# Patient Record
Sex: FEMALE | Race: WHITE | URBAN - METROPOLITAN AREA
[De-identification: names, ages, dates, MRNs, and addresses within clinical notes are randomized per-mention and may not be internally consistent; named-entity substitution may affect disease eponyms.]

---

## 2017-01-24 PROBLEM — 55570000 ASTHMA WITHOUT STATUS ASTHMATICUS: Status: ACTIVE | Noted: 2017-01-24

## 2017-01-24 PROBLEM — 35489007 DEPRESSION: Status: ACTIVE | Noted: 2017-01-24

## 2017-01-24 PROBLEM — 38341003 HYPERTENSION: Status: ACTIVE | Noted: 2017-01-24

## 2017-01-24 PROBLEM — 235595009 GASTROESOPHAGEAL REFLUX DISEASE: Status: ACTIVE | Noted: 2017-01-24

## 2017-01-24 PROBLEM — 397825006 GASTRIC ULCER: Status: ACTIVE | Noted: 2017-01-24

## 2017-01-25 PROBLEM — 238136002 MORBID OBESITY: Status: ACTIVE | Noted: 2017-01-25

## 2017-01-25 PROBLEM — 197480006 ANXIETY DISORDER: Status: ACTIVE | Noted: 2017-01-25

## 2021-08-28 ENCOUNTER — TELEPHONE ENCOUNTER (OUTPATIENT)
Dept: URBAN - METROPOLITAN AREA CLINIC 13 | Facility: CLINIC | Age: 56
End: 2021-08-28

## 2021-08-28 RX ORDER — TERBINAFINE HYDROCHLORIDE 250 MG/1
TABLET ORAL
OUTPATIENT
End: 2019-05-20

## 2021-08-29 ENCOUNTER — TELEPHONE ENCOUNTER (OUTPATIENT)
Dept: URBAN - METROPOLITAN AREA CLINIC 13 | Facility: CLINIC | Age: 56
End: 2021-08-29

## 2021-08-29 RX ORDER — BIOTIN 10 MG
TABLET ORAL
Status: ACTIVE | COMMUNITY

## 2021-08-29 RX ORDER — CHLORTHALIDONE 25 MG/1
TABLET ORAL
Status: ACTIVE | COMMUNITY

## 2021-08-29 RX ORDER — AMLODIPINE BESYLATE 2.5 MG/1
TABLET ORAL
Status: ACTIVE | COMMUNITY

## 2021-08-29 RX ORDER — MELOXICAM 15 MG/1
TABLET ORAL
Status: ACTIVE | COMMUNITY

## 2021-08-29 RX ORDER — OMEPRAZOLE 10 MG/1
CAPSULE, DELAYED RELEASE ORAL
Status: ACTIVE | COMMUNITY

## 2021-08-29 RX ORDER — SUCRALFATE 1 G/1
TABLET ORAL
Status: ACTIVE | COMMUNITY
Start: 2019-05-20

## 2021-08-29 RX ORDER — LOSARTAN POTASSIUM 50 MG/1
TABLET, FILM COATED ORAL
Status: ACTIVE | COMMUNITY

## 2021-08-29 RX ORDER — DICLOFENAC SODIUM 75 MG/1
TABLET, DELAYED RELEASE ORAL
Status: ACTIVE | COMMUNITY

## 2022-07-07 ENCOUNTER — OFFICE VISIT (OUTPATIENT)
Dept: URBAN - METROPOLITAN AREA CLINIC 46 | Facility: CLINIC | Age: 57
End: 2022-07-07
Payer: COMMERCIAL

## 2022-07-07 VITALS
HEART RATE: 68 BPM | WEIGHT: 245.2 LBS | SYSTOLIC BLOOD PRESSURE: 137 MMHG | TEMPERATURE: 98.1 F | DIASTOLIC BLOOD PRESSURE: 81 MMHG | BODY MASS INDEX: 39.41 KG/M2 | HEIGHT: 66 IN

## 2022-07-07 DIAGNOSIS — R10.13 EPIGASTRIC PAIN: ICD-10-CM

## 2022-07-07 DIAGNOSIS — Z90.3 H/O GASTRIC SLEEVE: ICD-10-CM

## 2022-07-07 DIAGNOSIS — Z87.11 HISTORY OF PEPTIC ULCER DISEASE: ICD-10-CM

## 2022-07-07 PROCEDURE — 99214 OFFICE O/P EST MOD 30 MIN: CPT | Performed by: INTERNAL MEDICINE

## 2022-07-07 RX ORDER — OMEPRAZOLE 40 MG/1
1 CAPSULE 30 MINUTES BEFORE MORNING MEAL CAPSULE, DELAYED RELEASE ORAL ONCE A DAY
Qty: 30 | Refills: 2 | OUTPATIENT
Start: 2022-07-07

## 2022-07-07 RX ORDER — DULOXETINE 60 MG/1
1 CAPSULE CAPSULE, DELAYED RELEASE PELLETS ORAL ONCE A DAY
Status: ACTIVE | COMMUNITY

## 2022-07-07 RX ORDER — SUCRALFATE 1 G/1
1 TABLET ON AN EMPTY STOMACH TABLET ORAL
Qty: 42 | Refills: 1 | OUTPATIENT
Start: 2022-07-07 | End: 2022-08-04

## 2022-07-07 RX ORDER — DICLOFENAC SODIUM 75 MG/1
TABLET, DELAYED RELEASE ORAL
Status: ACTIVE | COMMUNITY

## 2022-07-07 RX ORDER — DULOXETINE 30 MG/1
1 CAPSULE CAPSULE, DELAYED RELEASE PELLETS ORAL ONCE A DAY
Status: ACTIVE | COMMUNITY

## 2022-07-07 RX ORDER — AMLODIPINE BESYLATE 2.5 MG/1
TABLET ORAL
COMMUNITY

## 2022-07-07 RX ORDER — LOSARTAN POTASSIUM 50 MG/1
TABLET, FILM COATED ORAL
COMMUNITY

## 2022-07-07 NOTE — PHYSICAL EXAM NEUROLOGIC:
oriented to person, place and time , normal sensation , short and long term memory intact
Statement Selected

## 2022-07-07 NOTE — HPI-TODAY'S VISIT:
56 y/o female presents for evaluation of abdominal pain. She was last seen in 5/2019 for evaluation of dysphagia and average risk colon cancer screening. She is status post Nissen fundoplication in 2015. EGD was grossly normal with empiric dilation with 45 FR bougie, and colonoscopy showed diverticulosis and internal hemorrhoids. She reports remote h/o bleeding PUD. She underwent gastric sleeve surgery in June 2020 and had to have Lap Nissen taken down at that time in order to have sleeve done. She also has h/o OA, and for three months was taking Celebrex daily as well as an OTC Prilosec. She stopped about a month ago. For the last several weeks, she has been having daily epigastric pain with radiation to the back, worsened by meals. She has had some nausea, but no vomiting. No melena or hematochezia. She does also still have her gallbladder. No fevers. She currently does not have a PCP and states she has not had any recent lab work done.

## 2022-07-25 ENCOUNTER — CLAIMS CREATED FROM THE CLAIM WINDOW (OUTPATIENT)
Dept: URBAN - METROPOLITAN AREA CLINIC 4 | Facility: CLINIC | Age: 57
End: 2022-07-25
Payer: COMMERCIAL

## 2022-07-25 ENCOUNTER — OFFICE VISIT (OUTPATIENT)
Dept: URBAN - METROPOLITAN AREA SURGERY CENTER 28 | Facility: SURGERY CENTER | Age: 57
End: 2022-07-25
Payer: COMMERCIAL

## 2022-07-25 ENCOUNTER — TELEPHONE ENCOUNTER (OUTPATIENT)
Dept: URBAN - METROPOLITAN AREA CLINIC 44 | Facility: CLINIC | Age: 57
End: 2022-07-25

## 2022-07-25 ENCOUNTER — LAB OUTSIDE AN ENCOUNTER (OUTPATIENT)
Dept: URBAN - METROPOLITAN AREA CLINIC 44 | Facility: CLINIC | Age: 57
End: 2022-07-25

## 2022-07-25 DIAGNOSIS — K31.89 ACQUIRED DEFORMITY OF DUODENUM: ICD-10-CM

## 2022-07-25 DIAGNOSIS — Z98.84 BARIATRIC SURG STAT-UNSP: ICD-10-CM

## 2022-07-25 DIAGNOSIS — R10.13 ABDOMINAL DISCOMFORT, EPIGASTRIC: ICD-10-CM

## 2022-07-25 DIAGNOSIS — K31.89 OTHER DISEASES OF STOMACH AND DUODENUM: ICD-10-CM

## 2022-07-25 PROCEDURE — 88312 SPECIAL STAINS GROUP 1: CPT | Performed by: PATHOLOGY

## 2022-07-25 PROCEDURE — 88305 TISSUE EXAM BY PATHOLOGIST: CPT | Performed by: PATHOLOGY

## 2022-07-25 PROCEDURE — 43239 EGD BIOPSY SINGLE/MULTIPLE: CPT | Performed by: INTERNAL MEDICINE

## 2022-07-25 PROCEDURE — G8907 PT DOC NO EVENTS ON DISCHARG: HCPCS | Performed by: INTERNAL MEDICINE

## 2022-07-25 RX ORDER — OMEPRAZOLE 40 MG/1
1 CAPSULE 30 MINUTES BEFORE MORNING MEAL CAPSULE, DELAYED RELEASE ORAL ONCE A DAY
Qty: 30 | Refills: 2 | Status: ACTIVE | COMMUNITY
Start: 2022-07-07

## 2022-07-25 RX ORDER — DULOXETINE 30 MG/1
1 CAPSULE CAPSULE, DELAYED RELEASE PELLETS ORAL ONCE A DAY
Status: ACTIVE | COMMUNITY

## 2022-07-25 RX ORDER — DICLOFENAC SODIUM 75 MG/1
TABLET, DELAYED RELEASE ORAL
Status: ACTIVE | COMMUNITY

## 2022-07-25 RX ORDER — DULOXETINE 60 MG/1
1 CAPSULE CAPSULE, DELAYED RELEASE PELLETS ORAL ONCE A DAY
Status: ACTIVE | COMMUNITY

## 2022-07-25 RX ORDER — SUCRALFATE 1 G/1
1 TABLET ON AN EMPTY STOMACH TABLET ORAL
Qty: 42 | Refills: 1 | Status: ACTIVE | COMMUNITY
Start: 2022-07-07 | End: 2022-08-04

## 2022-07-25 RX ORDER — LOSARTAN POTASSIUM 50 MG/1
TABLET, FILM COATED ORAL
COMMUNITY

## 2022-07-25 RX ORDER — AMLODIPINE BESYLATE 2.5 MG/1
TABLET ORAL
COMMUNITY

## 2022-07-25 RX ORDER — DICYCLOMINE HYDROCHLORIDE 20 MG/1
1 TABLET TABLET ORAL
Qty: 60 | Refills: 3 | OUTPATIENT

## 2022-08-09 ENCOUNTER — TELEPHONE ENCOUNTER (OUTPATIENT)
Dept: URBAN - METROPOLITAN AREA CLINIC 46 | Facility: CLINIC | Age: 57
End: 2022-08-09

## 2022-08-18 ENCOUNTER — TELEPHONE ENCOUNTER (OUTPATIENT)
Dept: URBAN - METROPOLITAN AREA CLINIC 46 | Facility: CLINIC | Age: 57
End: 2022-08-18

## 2022-08-25 ENCOUNTER — TELEPHONE ENCOUNTER (OUTPATIENT)
Dept: URBAN - METROPOLITAN AREA CLINIC 46 | Facility: CLINIC | Age: 57
End: 2022-08-25

## 2022-08-25 RX ORDER — DICYCLOMINE HYDROCHLORIDE 20 MG/1
1 TABLET TABLET ORAL
Qty: 90 | Refills: 3

## 2022-08-25 RX ORDER — OMEPRAZOLE 40 MG/1
1 CAPSULE 30 MINUTES BEFORE MORNING MEAL CAPSULE, DELAYED RELEASE ORAL ONCE A DAY
Qty: 90 | Refills: 3

## 2023-04-07 ENCOUNTER — TELEPHONE ENCOUNTER (OUTPATIENT)
Dept: URBAN - METROPOLITAN AREA CLINIC 44 | Facility: CLINIC | Age: 58
End: 2023-04-07

## 2023-04-07 RX ORDER — OMEPRAZOLE 40 MG/1
1 CAPSULE 30 MINUTES BEFORE MORNING MEAL CAPSULE, DELAYED RELEASE ORAL ONCE A DAY
Qty: 90 | Refills: 3 | Status: ACTIVE | COMMUNITY

## 2023-04-07 RX ORDER — DULOXETINE 30 MG/1
1 CAPSULE CAPSULE, DELAYED RELEASE PELLETS ORAL ONCE A DAY
Status: ACTIVE | COMMUNITY

## 2023-04-07 RX ORDER — AMLODIPINE BESYLATE 2.5 MG/1
TABLET ORAL
COMMUNITY

## 2023-04-07 RX ORDER — LOSARTAN POTASSIUM 50 MG/1
TABLET, FILM COATED ORAL
COMMUNITY

## 2023-04-07 RX ORDER — DICYCLOMINE HYDROCHLORIDE 20 MG/1
1 TABLET TABLET ORAL
Qty: 90 | Refills: 3 | Status: ACTIVE | COMMUNITY

## 2023-04-07 RX ORDER — DICLOFENAC SODIUM 75 MG/1
TABLET, DELAYED RELEASE ORAL
Status: ACTIVE | COMMUNITY

## 2023-04-07 RX ORDER — DULOXETINE 60 MG/1
1 CAPSULE CAPSULE, DELAYED RELEASE PELLETS ORAL ONCE A DAY
Status: ACTIVE | COMMUNITY

## 2023-04-07 RX ORDER — CIPROFLOXACIN HYDROCHLORIDE 500 MG/1
1 TABLET TABLET, FILM COATED ORAL
Qty: 20 TABLET | Refills: 0 | OUTPATIENT
Start: 2023-04-10 | End: 2023-04-13

## 2023-04-10 PROBLEM — 88111009: Status: ACTIVE | Noted: 2023-04-10

## 2023-05-12 ENCOUNTER — OFFICE VISIT (OUTPATIENT)
Dept: URBAN - METROPOLITAN AREA TELEHEALTH 2 | Facility: TELEHEALTH | Age: 58
End: 2023-05-12

## 2023-06-15 ENCOUNTER — OFFICE VISIT (OUTPATIENT)
Dept: URBAN - METROPOLITAN AREA CLINIC 19 | Facility: CLINIC | Age: 58
End: 2023-06-15
Payer: COMMERCIAL

## 2023-06-15 ENCOUNTER — LAB OUTSIDE AN ENCOUNTER (OUTPATIENT)
Dept: URBAN - METROPOLITAN AREA CLINIC 19 | Facility: CLINIC | Age: 58
End: 2023-06-15

## 2023-06-15 ENCOUNTER — WEB ENCOUNTER (OUTPATIENT)
Dept: URBAN - METROPOLITAN AREA CLINIC 19 | Facility: CLINIC | Age: 58
End: 2023-06-15

## 2023-06-15 VITALS
TEMPERATURE: 97.4 F | HEART RATE: 72 BPM | BODY MASS INDEX: 37.35 KG/M2 | HEIGHT: 66 IN | SYSTOLIC BLOOD PRESSURE: 124 MMHG | DIASTOLIC BLOOD PRESSURE: 76 MMHG | WEIGHT: 232.4 LBS

## 2023-06-15 DIAGNOSIS — R19.7 ACUTE DIARRHEA: ICD-10-CM

## 2023-06-15 DIAGNOSIS — R10.31 RIGHT LOWER QUADRANT ABDOMINAL PAIN: ICD-10-CM

## 2023-06-15 PROCEDURE — 99214 OFFICE O/P EST MOD 30 MIN: CPT | Performed by: INTERNAL MEDICINE

## 2023-06-15 RX ORDER — DULOXETINE HYDROCHLORIDE 30 MG/1
1 CAPSULE CAPSULE, DELAYED RELEASE ORAL ONCE A DAY
Status: ACTIVE | COMMUNITY

## 2023-06-15 RX ORDER — DULOXETINE HYDROCHLORIDE 60 MG/1
1 CAPSULE CAPSULE, DELAYED RELEASE ORAL ONCE A DAY
Status: ACTIVE | COMMUNITY

## 2023-06-15 RX ORDER — DICYCLOMINE HYDROCHLORIDE 20 MG/1
1 TABLET TABLET ORAL
Qty: 90 | Refills: 1 | OUTPATIENT
Start: 2023-06-15 | End: 2023-08-14

## 2023-06-15 NOTE — HPI-TODAY'S VISIT:
Previous procedures EGD 2019 normal Colonoscopy 2019 diverticulosis, internal hemorrhoids.  7/7/2022 seen by Janeth MOSHER  at [facility] gastroenterology    58 y/o female presents for evaluation of abdominal pain. She was last seen in 5/2019 for evaluation of dysphagia and average risk colon cancer screening. She is status post Nissen fundoplication in 2015. EGD was grossly normal with empiric dilation with 45 FR bougie, and colonoscopy showed diverticulosis and internal hemorrhoids. She reports remote h/o bleeding PUD. She underwent gastric sleeve surgery in June 2020 and had to have Lap Nissen taken down at that time in order to have sleeve done. She also has h/o OA, and for three months was taking Celebrex daily as well as an OTC Prilosec. She stopped about a month ago. For the last several weeks, she has been having daily epigastric pain with radiation to the back, worsened by meals. She has had some nausea, but no vomiting. No melena or hematochezia. She does also still have her gallbladder. No fevers. She currently does not have a PCP and states she has not had any recent lab work done. PLAN placed on omeprazole, was to get an EGD, and labs.  EGD 2022 evidence of sleeve gastrectomy found, healthy appearing, BX stomach negative needs HIDA, Bentyl before meals.  She called her GI office in April 2023 for severe diarrhea and stool studies were ordered, sent in Cipro, align, Imodium. No stool reports  TODAY 6/15/23 Elle BERGER NP She esteban stool studies that were performed with her bariatric surgeon- C/S, C Diff, giardia were negative. She did not take the Cipro that was sent in.   She has diarrhea since march, having 2-7 watery/mushy loose stools a day, with nocturnal symptoms. Denies blood in stool. She has taken Imodium with some improvement.  Has not been exposed to anyone with infectious diarrhea, no recent abx use.  She has had RLQ abdominal pain since March, intermittent, at random. Denies fever/chills, wt loss. She does drink wine nightly, about a bottle.  PLAN chronic stool studies, Ct scan, Colonoscopy

## 2023-06-15 NOTE — PHYSICAL EXAM GASTROINTESTINAL
Abdomen, soft, mild tendernes rlq/lower abd , nondistended, normal bowel sounds, Liver and Spleen, no hepatomegaly present

## 2023-06-27 ENCOUNTER — TELEPHONE ENCOUNTER (OUTPATIENT)
Dept: URBAN - METROPOLITAN AREA CLINIC 19 | Facility: CLINIC | Age: 58
End: 2023-06-27

## 2023-06-28 ENCOUNTER — TELEPHONE ENCOUNTER (OUTPATIENT)
Dept: URBAN - METROPOLITAN AREA CLINIC 19 | Facility: CLINIC | Age: 58
End: 2023-06-28

## 2023-06-28 ENCOUNTER — CLAIMS CREATED FROM THE CLAIM WINDOW (OUTPATIENT)
Dept: URBAN - METROPOLITAN AREA CLINIC 4 | Facility: CLINIC | Age: 58
End: 2023-06-28
Payer: COMMERCIAL

## 2023-06-28 ENCOUNTER — OFFICE VISIT (OUTPATIENT)
Dept: URBAN - METROPOLITAN AREA SURGERY CENTER 31 | Facility: SURGERY CENTER | Age: 58
End: 2023-06-28
Payer: COMMERCIAL

## 2023-06-28 DIAGNOSIS — K52.832 LYMPHOCYTIC  COLITIS: ICD-10-CM

## 2023-06-28 DIAGNOSIS — R19.7 CHRONIC DIARRHEA: ICD-10-CM

## 2023-06-28 DIAGNOSIS — R10.31 RLQ ABDOMINAL PAIN: ICD-10-CM

## 2023-06-28 DIAGNOSIS — K52.832 LYMPHOCYTIC COLITIS: ICD-10-CM

## 2023-06-28 PROCEDURE — 88313 SPECIAL STAINS GROUP 2: CPT | Performed by: PATHOLOGY

## 2023-06-28 PROCEDURE — 45380 COLONOSCOPY AND BIOPSY: CPT | Performed by: INTERNAL MEDICINE

## 2023-06-28 PROCEDURE — 88305 TISSUE EXAM BY PATHOLOGIST: CPT | Performed by: PATHOLOGY

## 2023-06-28 PROCEDURE — 88342 IMHCHEM/IMCYTCHM 1ST ANTB: CPT | Performed by: PATHOLOGY

## 2023-06-28 PROCEDURE — G8907 PT DOC NO EVENTS ON DISCHARG: HCPCS | Performed by: INTERNAL MEDICINE

## 2023-06-28 RX ORDER — DULOXETINE HYDROCHLORIDE 30 MG/1
1 CAPSULE CAPSULE, DELAYED RELEASE ORAL ONCE A DAY
Status: ACTIVE | COMMUNITY

## 2023-06-28 RX ORDER — DULOXETINE HYDROCHLORIDE 60 MG/1
1 CAPSULE CAPSULE, DELAYED RELEASE ORAL ONCE A DAY
Status: ACTIVE | COMMUNITY

## 2023-06-28 RX ORDER — DICYCLOMINE HYDROCHLORIDE 20 MG/1
1 TABLET TABLET ORAL
Qty: 90 | Refills: 1 | Status: ACTIVE | COMMUNITY
Start: 2023-06-15 | End: 2023-08-14

## 2023-07-07 ENCOUNTER — TELEPHONE ENCOUNTER (OUTPATIENT)
Dept: URBAN - METROPOLITAN AREA CLINIC 19 | Facility: CLINIC | Age: 58
End: 2023-07-07

## 2023-08-01 ENCOUNTER — DASHBOARD ENCOUNTERS (OUTPATIENT)
Age: 58
End: 2023-08-01

## 2023-08-01 ENCOUNTER — OFFICE VISIT (OUTPATIENT)
Dept: URBAN - METROPOLITAN AREA CLINIC 19 | Facility: CLINIC | Age: 58
End: 2023-08-01
Payer: COMMERCIAL

## 2023-08-01 ENCOUNTER — TELEPHONE ENCOUNTER (OUTPATIENT)
Dept: URBAN - METROPOLITAN AREA CLINIC 19 | Facility: CLINIC | Age: 58
End: 2023-08-01

## 2023-08-01 VITALS
HEIGHT: 66 IN | WEIGHT: 230.8 LBS | TEMPERATURE: 97.2 F | BODY MASS INDEX: 37.09 KG/M2 | DIASTOLIC BLOOD PRESSURE: 80 MMHG | SYSTOLIC BLOOD PRESSURE: 142 MMHG | OXYGEN SATURATION: 96 % | HEART RATE: 65 BPM

## 2023-08-01 DIAGNOSIS — K52.832 LYMPHOCYTIC  COLITIS: ICD-10-CM

## 2023-08-01 PROCEDURE — 99214 OFFICE O/P EST MOD 30 MIN: CPT | Performed by: NURSE PRACTITIONER

## 2023-08-01 RX ORDER — BUDESONIDE 3 MG/1
3 CAPSULES ONCE A DAY FOR 56 DAYS, 2 CAPSULES ONCE A DAY FOR 14 DAYS, 1 CAPSULE ONCE A DAY FOR 14 DAYS CAPSULE ORAL
Qty: 210 CAPSULE | Refills: 0 | OUTPATIENT
Start: 2023-08-01

## 2023-08-01 RX ORDER — DULOXETINE HYDROCHLORIDE 60 MG/1
1 CAPSULE CAPSULE, DELAYED RELEASE ORAL ONCE A DAY
Status: ACTIVE | COMMUNITY

## 2023-08-01 RX ORDER — DULOXETINE HYDROCHLORIDE 30 MG/1
1 CAPSULE CAPSULE, DELAYED RELEASE ORAL ONCE A DAY
Status: ACTIVE | COMMUNITY

## 2023-08-01 RX ORDER — DICYCLOMINE HYDROCHLORIDE 20 MG/1
1 TABLET TABLET ORAL
Qty: 90 | Refills: 1 | Status: ACTIVE | COMMUNITY
Start: 2023-06-15 | End: 2023-08-14

## 2023-08-01 NOTE — HPI-TODAY'S VISIT:
Ms. Cabrera is a 59 yo female who presents today for procedure follow-up. Last seen in clinic by Elle on 6/15/2023 with c/o RLQ abdominal pain and chronic diarrhea. Stool test for fecal calprotectin and pancreatic elastase ordered-not completed CT A/P with contrast 6/23/2023-epiploic appendage along the anterior aspect of the mid sigmoid colon just to the right of midline.  No surrounding fat inflammation to suggest acute epiploic appendagitis.  Appendicolith.  No signs of acute appendicitis.  Moderate sized hiatal hernia and patient with prior gastric sleeve.  Colonic diverticulosis without diverticulitis.  Bilateral parapelvic renal cyst versus UPJ stenosis.  Would consider CT urogram for further evaluate.  Small supraumbilical fat-containing midline ventral hernia.  Evidence of previous exposure to granulomatous process. Colonoscopy was done by Dr. Newby on 6/28/2023.  TI appeared normal.  Diverticulosis in the sigmoid colon.  Internal hemorrhoids.  Random biopsy revealed lymphocytic colitis.  Negative for infectious organisms, dysplasia or malignancy.        Previous procedures:        EGD 2019 normal        Colonoscopy 2019 diverticulosis, internal hemorrhoids.        7/7/2022 seen by Janeth MOSHER at [facility] gastroenterology        56 y/o female presents for evaluation of abdominal pain. She was last seen in 5/2019 for evaluation of dysphagia and average risk colon cancer screening. She is status post Nissen fundoplication in 2015. EGD was grossly normal with empiric dilation with 45 FR bougie, and colonoscopy showed diverticulosis and internal hemorrhoids. She reports remote h/o bleeding PUD. She underwent gastric sleeve surgery in June 2020 and had to have Lap Nissen taken down at that time in order to have sleeve done. She also has h/o OA, and for three months was taking Celebrex daily as well as an OTC Prilosec. She stopped about a month ago. For the last several weeks, she has been having daily epigastric pain with radiation to the back, worsened by meals. She has had some nausea, but no vomiting. No melena or hematochezia. She does also still have her gallbladder. No fevers. She currently does not have a PCP and states she has not had any recent lab work done.        PLAN placed on omeprazole, was to get an EGD, and labs.        EGD 2022 evidence of sleeve gastrectomy found, healthy appearing, BX stomach negative needs HIDA, Bentyl before meals.        She called her GI office in April 2023 for severe diarrhea and stool studies were ordered, sent in Cipro, align, Imodium.        No stool reports        TODAY 6/15/23 Elle S NP        She esteban stool studies that were performed with her bariatric surgeon- C/S, C Diff, giardia were negative. She did not take the Cipro that was sent in.        She has diarrhea since march, having 2-7 watery/mushy loose stools a day, with nocturnal symptoms. Denies blood in stool. She has taken Imodium with some improvement.        Has not been exposed to anyone with infectious diarrhea, no recent abx use.        She has had RLQ abdominal pain since March, intermittent, at random. Denies fever/chills, wt loss. She does drink wine nightly, about a bottle.        PLAN chronic stool studies, Ct scan, Colonoscopy..

## 2023-08-04 ENCOUNTER — OFFICE VISIT (OUTPATIENT)
Dept: URBAN - METROPOLITAN AREA TELEHEALTH 2 | Facility: TELEHEALTH | Age: 58
End: 2023-08-04
Payer: COMMERCIAL

## 2023-08-04 VITALS — WEIGHT: 230 LBS | HEIGHT: 66 IN | BODY MASS INDEX: 36.96 KG/M2

## 2023-08-04 DIAGNOSIS — K52.832 LYMPHOCYTIC COLITIS: ICD-10-CM

## 2023-08-04 PROCEDURE — 97802 MEDICAL NUTRITION INDIV IN: CPT | Performed by: DIETITIAN, REGISTERED

## 2023-08-04 RX ORDER — DULOXETINE HYDROCHLORIDE 30 MG/1
1 CAPSULE CAPSULE, DELAYED RELEASE ORAL ONCE A DAY
Status: ACTIVE | COMMUNITY

## 2023-08-04 RX ORDER — DICYCLOMINE HYDROCHLORIDE 20 MG/1
1 TABLET TABLET ORAL
Qty: 90 | Refills: 1 | Status: ACTIVE | COMMUNITY
Start: 2023-06-15 | End: 2023-08-14

## 2023-08-04 RX ORDER — BUDESONIDE 3 MG/1
3 CAPSULES ONCE A DAY FOR 56 DAYS, 2 CAPSULES ONCE A DAY FOR 14 DAYS, 1 CAPSULE ONCE A DAY FOR 14 DAYS CAPSULE ORAL
Qty: 210 CAPSULE | Refills: 0 | Status: ACTIVE | COMMUNITY
Start: 2023-08-01

## 2023-08-04 RX ORDER — DULOXETINE HYDROCHLORIDE 60 MG/1
1 CAPSULE CAPSULE, DELAYED RELEASE ORAL ONCE A DAY
Status: ACTIVE | COMMUNITY

## 2023-08-21 ENCOUNTER — OFFICE VISIT (OUTPATIENT)
Dept: URBAN - METROPOLITAN AREA CLINIC 19 | Facility: CLINIC | Age: 58
End: 2023-08-21